# Patient Record
(demographics unavailable — no encounter records)

---

## 2025-03-27 NOTE — HISTORY OF PRESENT ILLNESS
[FreeTextEntry1] : 83-year-old woman right-handed companied by her , history of partial seizures, cognitive dysfunction, here for follow-up visit.  Last time seen in the office, September 2024.  Since then no episodes of blackouts, no syncope, no periods of disorientation or confusion.  Continues on Keppra twice a day, well-tolerated. No mood changes, no dizziness no lightheadedness with it. Also on the Exelon patch, 13.3 mg daily, has been feels her cognitive function has remained pretty stable.  Some forgetfulness but no confusion, able to carry on her daily activities, she keeps herself very busy, exercises regularly, they walk every day, liquid yoga release once a week, and she swims at least 4 times a week with a wet suit in the beach.

## 2025-03-27 NOTE — DATA REVIEWED
[de-identified] : Normal 24-hour ambulatory EEG, performed by patient awake, drowsy and asleep.    Signatures     Electronically signed by : BHUPENDRA TOM MD; Sep 30 2024  4:34PM Eastern Standard Time (Author)   O-L Flap Text: The defect edges were debeveled with a #15 scalpel blade.  Given the location of the defect, shape of the defect and the proximity to free margins an O-L flap was deemed most appropriate.  Using a sterile surgical marker, an appropriate advancement flap was drawn incorporating the defect and placing the expected incisions within the relaxed skin tension lines where possible.    The area thus outlined was incised deep to adipose tissue with a #15 scalpel blade.  The skin margins were undermined to an appropriate distance in all directions utilizing iris scissors.

## 2025-03-27 NOTE — REVIEW OF SYSTEMS
[As Noted in HPI] : as noted in HPI [Memory Lapses or Loss] : memory loss [Seizures] : convulsions [Negative] : Heme/Lymph

## 2025-03-27 NOTE — PHYSICAL EXAM
[General Appearance - Alert] : alert [General Appearance - In No Acute Distress] : in no acute distress [Oriented To Time, Place, And Person] : oriented to person, place, and time [Impaired Insight] : insight and judgment were intact [Affect] : the affect was normal [Mood] : the mood was normal [Memory Recent] : recent memory was not impaired [Memory Remote] : remote memory was not impaired [Over the Past 2 Weeks, Have You Felt Down, Depressed, or Hopeless?] : 1.) Over the past 2 weeks, have you felt down, depressed, or hopeless? No [Over the Past 2 Weeks, Have You Felt Little Interest or Pleasure Doing Things?] : 2.) Over the past 2 weeks, have you felt little interest or pleasure doing things? No [Total Score ___ / 30] : the patient achieved a score of [unfilled] /30 [Date / Time ___ / 5] : date / time [unfilled] / 5 [Place ___ / 5] : place [unfilled] / 5 [Registration ___ / 3] : registration [unfilled] / 3 [Serial Sevens ___/5] : serial sevens [unfilled] / 5 [Naming 2 Objects ___ / 2] : naming two objects [unfilled] / 2 [Repeating a Sentence ___ / 1] : repeating a sentence [unfilled] / 1 [Writing a Sentence ___ / 1] : write sentence [unfilled] / 1 [3-stage Verbal Command ___ / 3] : three-stage verbal command [unfilled] / 3 [Written Command ___ / 1] : written command [unfilled] / 1 [Copy a Design ___ / 1] : copy a design [unfilled] / 1 [Recall ___ / 3] : recall [unfilled] / 3 [Cranial Nerves Optic (II)] : visual acuity intact bilaterally,  visual fields full to confrontation, pupils equal round and reactive to light [Cranial Nerves Oculomotor (III)] : extraocular motion intact [Cranial Nerves Trigeminal (V)] : facial sensation intact symmetrically [Cranial Nerves Facial (VII)] : face symmetrical [Cranial Nerves Vestibulocochlear (VIII)] : hearing was intact bilaterally [Cranial Nerves Glossopharyngeal (IX)] : tongue and palate midline [Cranial Nerves Accessory (XI - Cranial And Spinal)] : head turning and shoulder shrug symmetric [Cranial Nerves Hypoglossal (XII)] : there was no tongue deviation with protrusion [Motor Strength] : muscle strength was normal in all four extremities [No Muscle Atrophy] : normal bulk in all four extremities [Motor Handedness Right-Handed] : the patient is right hand dominant [Sensation Tactile Decrease] : light touch was intact [Abnormal Walk] : normal gait [Dysdiadochokinesia Bilaterally] : not present [0] : Ankle jerk left 0

## 2025-03-27 NOTE — DISCUSSION/SUMMARY
[FreeTextEntry1] : 83-year-old woman history of cognitive dysfunction, mild, stable on Exelon patch.  History of partial seizures, no clinical events.  Last ambulatory EEG - September 2024. Reviewed and discussed treatment, no change at this time. Encourage compliance, risk discussed risk of seizure, sudden death. Return to office, 6 to 9 months.